# Patient Record
Sex: MALE | URBAN - METROPOLITAN AREA
[De-identification: names, ages, dates, MRNs, and addresses within clinical notes are randomized per-mention and may not be internally consistent; named-entity substitution may affect disease eponyms.]

---

## 2022-05-03 ENCOUNTER — OFFICE VISIT (OUTPATIENT)
Dept: URBAN - METROPOLITAN AREA CLINIC 21 | Facility: CLINIC | Age: 82
End: 2022-05-03
Payer: MEDICARE

## 2022-05-03 DIAGNOSIS — H26.493 OTHER SECONDARY CATARACT, BILATERAL: ICD-10-CM

## 2022-05-03 DIAGNOSIS — E11.9 DIABETES MELLITUS TYPE 2 WITHOUT MENTION OF COMPLICATION: Primary | ICD-10-CM

## 2022-05-03 PROCEDURE — 99204 OFFICE O/P NEW MOD 45 MIN: CPT | Performed by: OPHTHALMOLOGY

## 2022-05-03 ASSESSMENT — VISUAL ACUITY: OS: 20/30

## 2022-05-03 ASSESSMENT — INTRAOCULAR PRESSURE
OS: 19
OD: 19

## 2022-05-03 NOTE — IMPRESSION/PLAN
Impression: Other secondary cataract, bilateral: H26.493. Plan: Patient has a non-symptomatic posterior chamber opacity. No treatment needed at this time. Discussed PCO and that it may thicken over time requiring YAG laser surgery. Patient understands and will return for their regularly scheduled visits or sooner if there are any vision changes. Follow up with toro Burr to proceed with new glasses rx.

## 2022-05-03 NOTE — IMPRESSION/PLAN
Impression: Diabetes mellitus Type 2 without mention of complication: S40.5. Plan: Strict glycemic control advised. Follow up with PCP encouraged. Follow up on annual basis or sooner if any change in vision noted.

## 2022-08-16 ENCOUNTER — LAB REQUISITION (OUTPATIENT)
Dept: LAB | Age: 82
End: 2022-08-16

## 2022-08-16 PROCEDURE — 87086 URINE CULTURE/COLONY COUNT: CPT | Performed by: CLINICAL MEDICAL LABORATORY

## 2022-08-16 PROCEDURE — PSEU9005 URINE, BACTERIAL CULTURE: Performed by: CLINICAL MEDICAL LABORATORY

## 2022-08-18 LAB — BACTERIA UR CULT: NORMAL

## 2024-12-04 ENCOUNTER — OFFICE VISIT (OUTPATIENT)
Dept: URBAN - METROPOLITAN AREA CLINIC 21 | Facility: CLINIC | Age: 84
End: 2024-12-04
Payer: MEDICARE

## 2024-12-04 DIAGNOSIS — E11.9 DIABETES MELLITUS TYPE 2 WITHOUT MENTION OF COMPLICATION: Primary | ICD-10-CM

## 2024-12-04 DIAGNOSIS — H52.4 PRESBYOPIA: ICD-10-CM

## 2024-12-04 DIAGNOSIS — H40.023 OPEN ANGLE WITH BORDERLINE FINDINGS, HIGH RISK, BILATERAL: ICD-10-CM

## 2024-12-04 DIAGNOSIS — H26.493 OTHER SECONDARY CATARACT, BILATERAL: ICD-10-CM

## 2024-12-04 PROCEDURE — 99204 OFFICE O/P NEW MOD 45 MIN: CPT

## 2024-12-04 RX ORDER — APIXABAN 2.5 MG/1
2.5 MG TABLET, FILM COATED ORAL
Qty: 0 | Refills: 0 | Status: ACTIVE
Start: 2024-12-04

## 2024-12-04 ASSESSMENT — VISUAL ACUITY
OS: 20/50
OD: 20/50

## 2024-12-04 ASSESSMENT — INTRAOCULAR PRESSURE
OS: 18
OD: 18